# Patient Record
Sex: FEMALE | Race: WHITE | NOT HISPANIC OR LATINO | Employment: UNEMPLOYED | URBAN - METROPOLITAN AREA
[De-identification: names, ages, dates, MRNs, and addresses within clinical notes are randomized per-mention and may not be internally consistent; named-entity substitution may affect disease eponyms.]

---

## 2017-08-01 ENCOUNTER — OFFICE VISIT (OUTPATIENT)
Dept: GYNECOLOGIC ONCOLOGY | Facility: CLINIC | Age: 37
End: 2017-08-01

## 2017-08-01 VITALS
WEIGHT: 229.8 LBS | DIASTOLIC BLOOD PRESSURE: 72 MMHG | HEIGHT: 63 IN | HEART RATE: 78 BPM | SYSTOLIC BLOOD PRESSURE: 122 MMHG | RESPIRATION RATE: 16 BRPM | TEMPERATURE: 97.7 F | BODY MASS INDEX: 40.72 KG/M2 | OXYGEN SATURATION: 97 %

## 2017-08-01 DIAGNOSIS — D07.1 VIN III (VULVAR INTRAEPITHELIAL NEOPLASIA III): Primary | ICD-10-CM

## 2017-08-01 DIAGNOSIS — Z72.0 TOBACCO ABUSE: ICD-10-CM

## 2017-08-01 PROCEDURE — 99244 OFF/OP CNSLTJ NEW/EST MOD 40: CPT | Performed by: OBSTETRICS & GYNECOLOGY

## 2017-08-01 RX ORDER — LORAZEPAM 1 MG/1
1 TABLET ORAL EVERY 8 HOURS PRN
COMMUNITY

## 2017-08-01 RX ORDER — SUMATRIPTAN 100 MG/1
100 TABLET, FILM COATED ORAL
COMMUNITY

## 2017-08-01 RX ORDER — DULOXETIN HYDROCHLORIDE 60 MG/1
60 CAPSULE, DELAYED RELEASE ORAL DAILY
COMMUNITY

## 2017-08-01 RX ORDER — ALBUTEROL SULFATE 1.25 MG/3ML
1 SOLUTION RESPIRATORY (INHALATION) EVERY 6 HOURS PRN
COMMUNITY

## 2017-08-01 RX ORDER — HYDROXYZINE 50 MG/1
50 TABLET, FILM COATED ORAL 3 TIMES DAILY PRN
COMMUNITY

## 2017-08-01 RX ORDER — BUDESONIDE AND FORMOTEROL FUMARATE DIHYDRATE 160; 4.5 UG/1; UG/1
2 AEROSOL RESPIRATORY (INHALATION)
COMMUNITY

## 2017-08-02 ENCOUNTER — PREP FOR SURGERY (OUTPATIENT)
Dept: GYNECOLOGIC ONCOLOGY | Facility: CLINIC | Age: 37
End: 2017-08-02

## 2017-08-02 DIAGNOSIS — Z97.5 IUD CONTRACEPTION: ICD-10-CM

## 2017-08-02 DIAGNOSIS — D07.1 VIN III (VULVAR INTRAEPITHELIAL NEOPLASIA III): Primary | ICD-10-CM

## 2017-08-05 PROBLEM — Z72.0 TOBACCO ABUSE: Status: ACTIVE | Noted: 2017-08-05

## 2017-08-05 PROBLEM — D07.1 VIN III (VULVAR INTRAEPITHELIAL NEOPLASIA III): Status: ACTIVE | Noted: 2017-08-05

## 2017-08-05 NOTE — PROGRESS NOTES
"Denita Haddad  [id number]  1980      Reason for visit:  CLAUDIA 3    Consultation:  Patient is being seen at the request of Luisa Benito for CLAUDIA 3    History of present illness:  The patient is a 37 y.o. year old female who presents today for treatment and evaluation of CLAUDIA 3.    Patient first noted lesions due to discomfort with urination.  She also noted pruritus.  She describes her discomfort with urination as \"razor blades\".  She hasn't Mirena IUD for birth control and this is due to be changed.  Patient denies history of previous lesions.  As a part of her evaluation, she underwent Pap smear 2017 which was within normal limits.  Transformation zone was present.  No HPV testing was performed.  She underwent biopsy of right labial lesion and introitus lesion on 2017.  These both showed CLAUDIA 3.    She does have a history of hidradenitis suppurativa.  She notes that this is both at the axillary areas and the groin/vulvar areas.    Patient smokes <1 pack of cigarettes per day for 20 years.    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 Whitehead) in .  Since that time, she complains that she's had abdominal discomfort.    The patient is accompanied by her mother.  She goes to adult .  She is on disability.        OBGYN History:  She is a .  She does not use HRT. She does not have a history of abnormal pap smears. Most recent pap 2017.      Oncologic History:   No history exists.         Past Medical History:   Diagnosis Date   • Anxiety    • Asthma    • Chronic bronchitis    • COPD (chronic obstructive pulmonary disease)    • Depression    • Osteoarthritis        Past Surgical History:   Procedure Laterality Date   • BILATERAL BREAST REDUCTION     • CHOLECYSTECTOMY     • HERNIA REPAIR     • INTRAUTERINE DEVICE INSERTION     • PANCREAS SURGERY         MEDICATIONS: The current " medication list was reviewed with the patient and updated in the EMR this date per the Medical Assistant. Medication dosages and frequencies were confirmed to be accurate.      Allergies:  is allergic to avelox [moxifloxacin hcl in nacl]; cyprodenate; and morphine and related.    Social History:   Social History     Social History   • Marital status: Single     Spouse name: N/A   • Number of children: N/A   • Years of education: N/A     Occupational History   • Not on file.     Social History Main Topics   • Smoking status: Current Every Day Smoker     Packs/day: 0.50     Types: Cigarettes   • Smokeless tobacco: Never Used   • Alcohol use Yes      Comment: socially   • Drug use: No   • Sexual activity: Defer     Other Topics Concern   • Not on file     Social History Narrative   • No narrative on file       Family History:    Family History   Problem Relation Age of Onset   • COPD Father    • Atrial fibrillation Mother    • COPD Mother    • Cervical cancer Mother    • Colon cancer Cousin    • Colon cancer Paternal Uncle        Health Maintenance:    Health Maintenance   Topic Date Due   • PNEUMOCOCCAL VACCINE (19-64 MEDIUM RISK) (1 of 1 - PPSV23) 02/27/1999   • TDAP/TD VACCINES (1 - Tdap) 02/27/1999   • PAP SMEAR  08/01/2017   • INFLUENZA VACCINE  09/01/2017         Review of Systems   Constitutional: Positive for fatigue. Negative for appetite change, chills, fever and unexpected weight change.   HENT: Negative for congestion, hearing loss and sore throat.         Hoarseness   Eyes: Negative for redness and visual disturbance.   Respiratory: Positive for cough, shortness of breath and wheezing.         COPD, Nebs at home   Cardiovascular: Positive for palpitations (occasional tachycardia). Negative for chest pain and leg swelling.   Gastrointestinal: Positive for abdominal pain, nausea and vomiting (occasional). Negative for abdominal distention, blood in stool, constipation and diarrhea.   Endocrine: Negative.   "Negative for cold intolerance and heat intolerance.   Genitourinary: Positive for genital sores. Negative for difficulty urinating, dyspareunia, dysuria, frequency, hematuria, pelvic pain, urgency, vaginal bleeding, vaginal discharge and vaginal pain.   Musculoskeletal: Positive for arthralgias. Negative for back pain, gait problem and joint swelling.        Fibromyalgia   Skin: Positive for rash. Negative for wound.        Hidradenitis suppurativa   Allergic/Immunologic: Negative for food allergies and immunocompromised state (complains of frequent infections).   Neurological: Positive for dizziness, syncope (occasional), weakness, numbness and headaches. Negative for seizures and light-headedness.        Vertigo   Hematological: Negative for adenopathy. Bruises/bleeds easily.   Psychiatric/Behavioral: Positive for confusion, dysphoric mood and sleep disturbance. The patient is nervous/anxious.         Panic attacks, emotional problems, PTSD       Physical Exam    Vitals:    08/01/17 1250   BP: 122/72   Pulse: 78   Resp: 16   Temp: 97.7 °F (36.5 °C)   TempSrc: Temporal Artery    SpO2: 97%   Weight: 229 lb 12.8 oz (104 kg)   Height: 63\" (160 cm)     Body mass index is 40.71 kg/(m^2).      GENERAL: Alert, well-appearing female appearing her stated age who is in no apparent distress. Patient is morbidly obese by BMI criteria.  HEENT: Sclera anicteric. Head normocephalic, atraumatic. Mucus membranes moist.   NECK: Trachea midline, supple, without masses.  No thyromegaly.   BREASTS: Deferred  CARDIOVASCULAR: Normal rate, regular rhythm, no murmurs, rubs, or gallops.  No peripheral edema.  RESPIRATORY: Clear to auscultation bilaterally, normal respiratory effort  BACK:  No CVA tenderness, no vertebral tenderness on palpation.  GASTROINTESTINAL:  Abdomen is soft, generalized mild tenderness on palpation, non-distended, no rebound or guarding, no masses, or hernias. No HSM.   SKIN:  Warm, dry, well-perfused.  All visible " areas intact.  Scarring consistent with hidradenitis support to have bilateral axillary areas and throughout the vulva and groin.  No active drainage.  PSYCHIATRIC: AO x3, with appropriate affect, normal thought processes.  NEUROLOGIC: No focal deficits.  Moves extremities well.  MUSCULOSKELETAL: Normal gait and station.   EXTREMITIES:   No cyanosis, clubbing, symmetric.  LYMPHATICS:  No cervical or inguinal adenopathy noted.     PELVIC exam:    GYNECOLOGIC:  External genitalia are remarkable for 2 separate areas consistent with CLAUDIA 3.  There is a 2-3 cm area at the right tibia minora.  There is a 3 cm area at the posterior fourchette.. On speculum examination, the cervix was free from lesion.  IUD strings were visualized.  On bimanual examination no mass was appreciated.  Uterus was normal in size and shape. There is no cervical motion or uterine tenderness. No cervical mass was palpated. Parametria were smooth. Rectovaginal exam was deferred.     ECOG PS 0    PROCEDURES:  none    Diagnostic Data:    No results found for: ]      Assessment/Plan   This is a 37 y.o. woman with 2 separate areas of biopsy-proven CLAUDIA 3, need for Mirena IUD exchange    Patient was consented for wide local excision ×2 and Mirena IUD exchange.  Diagrams was drawn to show patient were the areas of concern are.    Risks and benefits of surgery were discussed.  This included, but was not limited to, infection and bleeding like when the skin is cut; damage to surrounding structures; and incisional complications.  Risk of DVT was addressed for major surgeries.  Standard of care efforts to minimize these risks were reviewed.  Typical recovery were discussed as well as post-procedure precautions.  Surgical implications of chronic illnesses on recovery and surgical outcome were reviewed.     Patient understands that she's at increased risk for recurrence due to her tobacco abuse.  She understands that tobacco abuse contributes to her  diagnosis of CLAUDIA 3.  She was strongly encouraged to quit smoking.    Patient understands that she's at increased risk for incisional separation due to her tobacco abuse.  In addition, obesity can contribute to incisional complications.  She understands of vulvar incisions are noted for you is for separation, delayed healing, and hematoma formation.    Patient verbalized understanding of the plan including the risks and benefits.  Appropriate perioperative testing including laboratory evaluation, EKG as clinically indicated, chest x-ray as clinically indicated, and preadmission evaluation were all ordered as a part of this patient's care.      FOLLOW UP: Return for surgery.    This was a 1 hour long visit with 33 minutes spent in face-to-face consultation regarding newly diagnosed CLAUDIA 3, surgical management thereof, risk factors, exchange of IUD    Note: Speech recognition transcription software was used to dictate portions of this document.  An attempt at proofreading has been made though minor errors in transcription may still be present.  Please do not hesitate to call our office with any questions.      Electronically Signed by: Sylvia Chavez MD  Date: 8/5/2017

## 2017-08-08 ENCOUNTER — TELEPHONE (OUTPATIENT)
Dept: GYNECOLOGIC ONCOLOGY | Facility: CLINIC | Age: 37
End: 2017-08-08

## 2017-08-09 ENCOUNTER — PREP FOR SURGERY (OUTPATIENT)
Dept: GYNECOLOGIC ONCOLOGY | Facility: CLINIC | Age: 37
End: 2017-08-09

## 2017-08-09 NOTE — PATIENT INSTRUCTIONS
Outpatient Pre-op Patient Education  *See checked boxes for your instructions*    Denita Haddad  [id number]  1980    SURGEON: Dr. Chavez    Appointment  [x]  1. Your surgery has been scheduled on 8-28-17 at [facility] located at [address] [facility]. You will need to be there at 8:30 AM.   [] 2.  You have pre-admission testing (PAT) appointment on  at .  You will need to be at hospital registration 10 minutes before that time.  If your PAT appointment is on Sunday, please enter through the Emergency Department.   [] 3.  The registration department is located in the long hallway between the [address].       The Day(s) Before Surgery  [x] 1.  Do not drink alcohol or smoke.     [x] 2.  Do not take vitamins or aspirin one week before surgery.       [x] 3.  If you are ill on the days leading up to your surgery, please call our office.      [x] 4.  If you are using medications for diabetes, call the physician who manages these and get instructions on how they should be taken before and after surgery.    [x] 5.  Nothing to eat or drink after midnight on 8-27-17.      [x] 6.  Please make prior arrangements for someone to drive you home after your procedure        The Day of Surgery  [x] 1.  Do not eat, drink, or chew gum.     [x] 2.  On the morning of your surgery, you may take her prescription medications with a sip of water. Bring all medication with you to the surgery center. (Diabetic patients should bring insulin if instructed to do so by their diabetes managing physician).   [x] 3. Bathe or shower the morning of your surgery. Do not use powders, lotions, or creams. Deodorants are okay.     [x] 4. Wear loose, comfortable clothing.     [x] 5. Bring holders for glasses, contacts, or dentures.      [x] 6. Bring any required payment and forms, including insurance cards. Leave all money and valuables at home.        Post-surgery Instructions  [x] 1.  For the first 24 hours,  rest and take periodic deep breaths to remove anesthetic agents from your body.   [x] 2.  Follow any specific instructions relevant to your particular surgery.     [x] 3.  Limit activity to avoid stress to the surgical site.      [x] 4.  Keep all dressings dry. Shower or bathe as instructed by your doctor.     [x] 5.  Drink and eat light foods. Remain on liquids alone only if nausea and vomiting occur. Return to your regular diet gradually, as tolerance allows.    [x] 6. Avoid alcohol for at least 24 hours.      [x] 7.  Take prescription pain medication as directed and with food.      [x] 8.  Call our office after discharge from the surgery center to make your post-op appointment.        In Case of Emergency:  Call our office if you experience any of the following:  - Excessive drainage, bleeding, swelling, or redness at the incision site  - Severe pain not eased by pain medication  - Temperature above 101  - Persistent nausea or vomiting  - Skin rash or general body itching

## 2017-08-23 ENCOUNTER — TELEPHONE (OUTPATIENT)
Dept: GYNECOLOGIC ONCOLOGY | Facility: CLINIC | Age: 37
End: 2017-08-23

## 2017-08-23 NOTE — TELEPHONE ENCOUNTER
Orders faxed successfully to [facility] for pt's upcoming outpt procedure 8-28-17 with Dr. Chavez.

## 2017-08-28 ENCOUNTER — LAB REQUISITION (OUTPATIENT)
Dept: LAB | Facility: HOSPITAL | Age: 37
End: 2017-08-28

## 2017-08-28 ENCOUNTER — OUTSIDE FACILITY SERVICE (OUTPATIENT)
Dept: GYNECOLOGIC ONCOLOGY | Facility: CLINIC | Age: 37
End: 2017-08-28

## 2017-08-28 DIAGNOSIS — C51.9 MALIGNANT NEOPLASM OF VULVA (HCC): ICD-10-CM

## 2017-08-28 LAB
ALBUMIN SERPL-MCNC: 4.2 G/DL (ref 3.2–4.8)
ALBUMIN/GLOB SERPL: 1.4 G/DL (ref 1.5–2.5)
ALP SERPL-CCNC: 62 U/L (ref 25–100)
ALT SERPL W P-5'-P-CCNC: 11 U/L (ref 7–40)
ANION GAP SERPL CALCULATED.3IONS-SCNC: 1 MMOL/L (ref 3–11)
AST SERPL-CCNC: 15 U/L (ref 0–33)
BASOPHILS # BLD AUTO: 0.14 10*3/MM3 (ref 0–0.2)
BASOPHILS NFR BLD AUTO: 1.3 % (ref 0–1)
BILIRUB SERPL-MCNC: 0.5 MG/DL (ref 0.3–1.2)
BUN BLD-MCNC: 13 MG/DL (ref 9–23)
BUN/CREAT SERPL: 26 (ref 7–25)
CALCIUM SPEC-SCNC: 9.3 MG/DL (ref 8.7–10.4)
CHLORIDE SERPL-SCNC: 114 MMOL/L (ref 99–109)
CO2 SERPL-SCNC: 24 MMOL/L (ref 20–31)
CREAT BLD-MCNC: 0.5 MG/DL (ref 0.6–1.3)
DEPRECATED RDW RBC AUTO: 49.7 FL (ref 37–54)
EOSINOPHIL # BLD AUTO: 0.44 10*3/MM3 (ref 0–0.3)
EOSINOPHIL NFR BLD AUTO: 4.1 % (ref 0–3)
ERYTHROCYTE [DISTWIDTH] IN BLOOD BY AUTOMATED COUNT: 14.7 % (ref 11.3–14.5)
GFR SERPL CREATININE-BSD FRML MDRD: 139 ML/MIN/1.73
GLOBULIN UR ELPH-MCNC: 3 GM/DL
GLUCOSE BLD-MCNC: 93 MG/DL (ref 70–100)
HCG INTACT+B SERPL-ACNC: <5 MIU/ML
HCT VFR BLD AUTO: 42.8 % (ref 34.5–44)
HGB BLD-MCNC: 14.1 G/DL (ref 11.5–15.5)
IMM GRANULOCYTES # BLD: 0.02 10*3/MM3 (ref 0–0.03)
IMM GRANULOCYTES NFR BLD: 0.2 % (ref 0–0.6)
LYMPHOCYTES # BLD AUTO: 4.33 10*3/MM3 (ref 0.6–4.8)
LYMPHOCYTES NFR BLD AUTO: 40.3 % (ref 24–44)
MCH RBC QN AUTO: 30.6 PG (ref 27–31)
MCHC RBC AUTO-ENTMCNC: 32.9 G/DL (ref 32–36)
MCV RBC AUTO: 92.8 FL (ref 80–99)
MONOCYTES # BLD AUTO: 1.2 10*3/MM3 (ref 0–1)
MONOCYTES NFR BLD AUTO: 11.2 % (ref 0–12)
NEUTROPHILS # BLD AUTO: 4.61 10*3/MM3 (ref 1.5–8.3)
NEUTROPHILS NFR BLD AUTO: 42.9 % (ref 41–71)
PLATELET # BLD AUTO: 366 10*3/MM3 (ref 150–450)
PMV BLD AUTO: 9.3 FL (ref 6–12)
POTASSIUM BLD-SCNC: 4.2 MMOL/L (ref 3.5–5.5)
PROT SERPL-MCNC: 7.2 G/DL (ref 5.7–8.2)
RBC # BLD AUTO: 4.61 10*6/MM3 (ref 3.89–5.14)
SODIUM BLD-SCNC: 139 MMOL/L (ref 132–146)
WBC NRBC COR # BLD: 10.74 10*3/MM3 (ref 3.5–10.8)

## 2017-08-28 PROCEDURE — 56620 VULVECTOMY SIMPLE PARTIAL: CPT | Performed by: OBSTETRICS & GYNECOLOGY

## 2017-08-28 PROCEDURE — 58300 INSERT INTRAUTERINE DEVICE: CPT | Performed by: OBSTETRICS & GYNECOLOGY

## 2017-08-28 PROCEDURE — 88307 TISSUE EXAM BY PATHOLOGIST: CPT | Performed by: OBSTETRICS & GYNECOLOGY

## 2017-08-28 PROCEDURE — 85025 COMPLETE CBC W/AUTO DIFF WBC: CPT | Performed by: OBSTETRICS & GYNECOLOGY

## 2017-08-28 PROCEDURE — 84702 CHORIONIC GONADOTROPIN TEST: CPT | Performed by: OBSTETRICS & GYNECOLOGY

## 2017-08-28 PROCEDURE — 80053 COMPREHEN METABOLIC PANEL: CPT | Performed by: OBSTETRICS & GYNECOLOGY

## 2017-08-28 PROCEDURE — 58301 REMOVE INTRAUTERINE DEVICE: CPT | Performed by: OBSTETRICS & GYNECOLOGY

## 2017-08-29 ENCOUNTER — TELEPHONE (OUTPATIENT)
Dept: GYNECOLOGIC ONCOLOGY | Facility: CLINIC | Age: 37
End: 2017-08-29

## 2017-08-29 NOTE — TELEPHONE ENCOUNTER
GYN Oncology    Telephone Call    Patient called during the night to ask if she could shower or bathe given her procedure yesterday (wide local excision ×2 and Mirena IUD exchange).  I counseled her that she could shower.  Avoid baths for now.  Call office with any additional questions.    Maryan Alvarez MD  08/29/17  7:59 AM

## 2017-08-30 LAB
CYTO UR: NORMAL
LAB AP CASE REPORT: NORMAL
LAB AP CLINICAL INFORMATION: NORMAL
Lab: NORMAL
PATH REPORT.FINAL DX SPEC: NORMAL
PATH REPORT.GROSS SPEC: NORMAL

## 2017-09-05 ENCOUNTER — DOCUMENTATION (OUTPATIENT)
Dept: ONCOLOGY | Facility: CLINIC | Age: 37
End: 2017-09-05

## 2017-09-05 ENCOUNTER — OFFICE VISIT (OUTPATIENT)
Dept: GYNECOLOGIC ONCOLOGY | Facility: CLINIC | Age: 37
End: 2017-09-05

## 2017-09-05 VITALS
OXYGEN SATURATION: 98 % | HEART RATE: 85 BPM | TEMPERATURE: 97.4 F | DIASTOLIC BLOOD PRESSURE: 74 MMHG | RESPIRATION RATE: 16 BRPM | SYSTOLIC BLOOD PRESSURE: 115 MMHG | WEIGHT: 234 LBS | BODY MASS INDEX: 41.45 KG/M2

## 2017-09-05 DIAGNOSIS — Z98.890 POST-OPERATIVE STATE: Primary | ICD-10-CM

## 2017-09-05 PROCEDURE — 99024 POSTOP FOLLOW-UP VISIT: CPT | Performed by: OBSTETRICS & GYNECOLOGY

## 2017-09-06 NOTE — PROGRESS NOTES
Denita Haddad  [id number]  1980      Reason for Visit:  Postoperative evaluation    History of Present Illness:  Patient is a very pleasant 37 y.o. woman who presents for a post operative evaluation status post renal IUD removal and replacement as well as wide local excision ×2 performed on 8/28/2017.  Surgery and hospital course were uncomplicated.  Today, patient notes normal bowel and bladder function.  Her pain is well controlled. She has questions about resuming normal activities.     Past Medical History, Past Surgical History, Social History, Family History have been reviewed and are without significant changes except as mentioned.    Review of Systems   A comprehensive 12 point review of systems was performed and was negative except as mentioned.    Medications:  The current medication list was reviewed in the EMR    ALLERGIES:    Allergies   Allergen Reactions   • Avelox [Moxifloxacin Hcl In Nacl] Nausea And Vomiting   • Cyprodenate Hives   • Morphine And Related Nausea And Vomiting           /74  Pulse 85  Temp 97.4 °F (36.3 °C) (Temporal Artery )   Resp 16  Wt 234 lb (106 kg)  SpO2 98%  BMI 41.45 kg/m2       Physical Exam  Constitutional:  Patient is a pleasant woman in no acute distress.  Gastrointestinal: Abdomen is soft and appropriately tender.  There is no mass palpated.  There is no rebound or guarding.   Extremities:  Bilateral lower extremities are non-tender.  Gynecologic:  External genitalia are remarkable for a right labia minora incision which had superficial separation.  There is superficial separation at the perineum that the most apical stitches intact.  These areas were appropriately tender and free from obvious infection.  There is granulation tissue noted at the incisions.  IUD strings were not evaluated due to concern about incisions.    PATHOLOGY  Final Diagnosis    1. 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  Negative for invasive neoplasm.  Margins free of high-grade dysplasia   2. PERINEAL LESION, PARTIAL VULVECTOMY:  Squamous carcinoma in-situ.  Negative for invasive neoplasm.  Margins free of high-grade dysplasia.         ASSESSMENT/PLAN:  Denita Haddad returns for a post-operative evaluation today.  All pathology reports were given to patient.      Overall, the patient is very pleased with her care.  I recommended continuation of post operative precautions as discussed.    Patient again encouraged to stop smoking.     She is to Return in about 2 weeks (around 9/19/2017).  Speculum examination today for IUD strings at that point depending on healing process.    Future letter to be sent to referring provider.    Note: Speech recognition transcription software was used to dictate portions of this document.  An attempt at proofreading has been made though minor errors in transcription may still be present.  Please do not hesitate to call our office with any questions.    Sylvia Chavez MD

## 2017-09-19 ENCOUNTER — OFFICE VISIT (OUTPATIENT)
Dept: GYNECOLOGIC ONCOLOGY | Facility: CLINIC | Age: 37
End: 2017-09-19

## 2017-09-19 VITALS
OXYGEN SATURATION: 95 % | BODY MASS INDEX: 40.92 KG/M2 | RESPIRATION RATE: 16 BRPM | HEART RATE: 93 BPM | DIASTOLIC BLOOD PRESSURE: 71 MMHG | WEIGHT: 231 LBS | SYSTOLIC BLOOD PRESSURE: 107 MMHG | TEMPERATURE: 97.9 F

## 2017-09-19 DIAGNOSIS — Z98.890 POST-OPERATIVE STATE: Primary | ICD-10-CM

## 2017-09-19 PROCEDURE — 99024 POSTOP FOLLOW-UP VISIT: CPT | Performed by: OBSTETRICS & GYNECOLOGY

## 2017-09-19 NOTE — PROGRESS NOTES
Denita Haddad  [id number]  1980      Reason for Visit:  Postoperative evaluation    History of Present Illness:  Patient is a very pleasant 37 y.o. woman who presents for a post operative evaluation status post IUD removal and re-insertion as well as WLE on 8/28. She was seen post-op on 9/5 and noted to have a superficial separation on the right labia minora. She has done well since then, tenderness at the area has since resolved. Denies any significant discharge from the area. Her pain is well controlled. She has questions about resuming normal activities.     Past Medical History, Past Surgical History, Social History, Family History have been reviewed and are without significant changes except as mentioned.    Review of Systems   A comprehensive 12 point review of systems was performed and was negative except as mentioned.    Medications:  The current medication list was reviewed in the EMR    ALLERGIES:    Allergies   Allergen Reactions   • Avelox [Moxifloxacin Hcl In Nacl] Nausea And Vomiting   • Cyprodenate Hives   • Morphine And Related Nausea And Vomiting           /71  Pulse 93  Temp 97.9 °F (36.6 °C) (Temporal Artery )   Resp 16  Wt 231 lb (105 kg)  SpO2 95%  BMI 40.92 kg/m2       Physical Exam  Constitutional:  Patient is a pleasant woman in no acute distress.  Gastrointestinal: Abdomen is soft and appropriately tender.  There is no mass palpated.  There is no rebound or guarding.    Extremities:  Bilateral lower extremities are non-tender.  Gynecologic: External genitalia are free from lesion. Previous incision site on inferior perineum appears to be healing very well without erythema or induration. On speculum examination, the cervix was free from lesion with IUD strings visualized from the cervix. On bimanual examination no mass was appreciated.  Uterus was normal in size and shape. There is no cervical motion or uterine tenderness. No cervical mass was palpated. Parametria were  smooth. Rectovaginal exam was deferred.       PATHOLOGY  Final Diagnosis    1. RIGHT LABIAL LESION, PARTIAL VULVECTOMY:  Squamous carcinoma in-situ; margins free of high-grade dysplasia.  Negative for invasive neoplasm.  Margins free of high-grade dysplasia   2. PERINEAL LESION, PARTIAL VULVECTOMY:  Squamous carcinoma in-situ.  Negative for invasive neoplasm.  Margins free of high-grade dysplasia.         ASSESSMENT/PLAN:  Denita Haddad returns for a post-operative evaluation today.  Continued to discuss need for smoking cessation. Patient was educated regarding tobacco abuse and CLAUDIA/carcinoma in situ of the vulva.      Overall, the patient is very pleased with her care.  I recommended continuation of post operative precautions as discussed.     I discussed follow-up with the patient and her father who drives her to her visits.  Would be much easier for them to have local follow-up.  I recommended they continue to follow up with Luisa Benito for evaluations every 6 months for the first 1-2 years and then annually and as needed after that.    She is to Return for on an as-needed basis; f/u with Luisa Benito in 6 months.    Note: Speech recognition transcription software was used to dictate portions of this document.  An attempt at proofreading has been made though minor errors in transcription may still be present.  Please do not hesitate to call our office with any questions.      Patient was seen and examined with Dr. To,  resident, who performed portions of the examination and documentation for this patient's care under my direct supervision.    Sylvia Chavez MD  09/20/17  2:54 PM

## 2022-01-05 ENCOUNTER — TRANSCRIBE ORDERS (OUTPATIENT)
Dept: ADMINISTRATIVE | Facility: HOSPITAL | Age: 42
End: 2022-01-05

## 2022-01-05 DIAGNOSIS — K86.81 EXOCRINE PANCREATIC INSUFFICIENCY: Primary | ICD-10-CM

## 2022-01-05 DIAGNOSIS — R11.0 NAUSEA: ICD-10-CM

## 2022-01-05 DIAGNOSIS — R11.2 NAUSEA AND VOMITING, INTRACTABILITY OF VOMITING NOT SPECIFIED, UNSPECIFIED VOMITING TYPE: ICD-10-CM
